# Patient Record
Sex: MALE | Race: WHITE | NOT HISPANIC OR LATINO | Employment: UNEMPLOYED | URBAN - METROPOLITAN AREA
[De-identification: names, ages, dates, MRNs, and addresses within clinical notes are randomized per-mention and may not be internally consistent; named-entity substitution may affect disease eponyms.]

---
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
## 2023-08-23 ENCOUNTER — TELEMEDICINE (OUTPATIENT)
Dept: FAMILY MEDICINE CLINIC | Facility: TELEHEALTH | Age: 18
End: 2023-08-23
Payer: COMMERCIAL

## 2023-08-23 VITALS — BODY MASS INDEX: 20.51 KG/M2 | WEIGHT: 165 LBS | HEIGHT: 75 IN

## 2023-08-23 DIAGNOSIS — H10.13 ALLERGIC CONJUNCTIVITIS OF BOTH EYES: Primary | ICD-10-CM

## 2023-08-23 PROCEDURE — 99213 OFFICE O/P EST LOW 20 MIN: CPT | Performed by: NURSE PRACTITIONER

## 2023-08-23 NOTE — PROGRESS NOTES
"You have chosen to receive care through a telehealth visit.  Do you consent to use a video/audio connection for your medical care today? Yes     HPI  Alcides Smith is a 18 y.o. male  presents with complaint of eye problem. He reports that he was seen in the Urgent Care yesterday for pink/red eye in both eyes and that they gave him eye drops and said that they would work in the first 24 hours. He was prescribed tobramycin ophthalmic drops. He reports that he was seen early yesterday morning and his eyes are even more red and itchy than before and that the eye drops not helping much. He reports that they seem better for about 20 minutes and then they are back to the same. He does report when asked that his allergies were bothering him when he moved into his new house but he started taking an allergy pill and using Flonase and those symptoms have improved. He is using cool compresses . He is trying not to rub the eyes but reports that it is hard.       Review of Systems   Constitutional:  Negative for fever.   HENT:  Negative for sneezing. Congestion: improved.   Eyes:  Positive for pain, discharge (watery, yellow at times in corner), redness and itching (bilateral).     Past Medical History:   Diagnosis Date    Allergic        No family history on file.    Social History     Socioeconomic History    Marital status: Single   Tobacco Use    Smoking status: Never    Smokeless tobacco: Never   Vaping Use    Vaping Use: Some days   Substance and Sexual Activity    Alcohol use: Never    Drug use: Never    Sexual activity: Defer       Alcides Smith  reports that he has never smoked. He has never used smokeless tobacco..      Ht 190.5 cm (75\")   Wt 74.8 kg (165 lb)   BMI 20.62 kg/mý     PHYSICAL EXAM  Physical Exam   Constitutional: He is oriented to person, place, and time. He appears well-developed.   HENT:   Head: Normocephalic and atraumatic.   Nose: Nose normal.   Eyes: Right eye exhibits discharge and " edema (erythematous). Left eye exhibits discharge and edema (erythematous). Right conjunctiva is injected. Left conjunctiva is injected.   Pulmonary/Chest:  No respiratory distress.  Neurological: He is alert and oriented to person, place, and time. No cranial nerve deficit.   Psychiatric: He has a normal mood and affect. His speech is normal and behavior is normal. Judgment and thought content normal.     Results for orders placed or performed during the hospital encounter of 03/27/22   POCT Influenza A/B    Specimen: Swab   Result Value Ref Range    Rapid Influenza A Ag Negative Negative    Rapid Influenza B Ag Negative Negative    Internal Control Passed Passed    Lot Number [id number]     Expiration Date 10/23        Diagnoses and all orders for this visit:    1. Allergic conjunctivitis of both eyes (Primary)    Other orders  -     dexAMETHasone (DECADRON) 0.1 % ophthalmic suspension; Administer 1 drop to both eyes Every 4 (Four) Hours for 3 days.  Dispense: 5 mL; Refill: 0    May continue tobramycin ophthalmic drops ordered at yesterdays Urgent Care visit  Start dexamethasone ophthalmic drops as directed  Continue Flonase and antihistamine of choice  Wash eye with clean cloth and baby shampoo  Apply eye drops as directed  Try not to touch eyes  Wash hands frequently  Continue cool compresses for itching and selling 5-10 minutes 3-4 times daily  Try not to rub or scratch eyes    FOLLOW-UP  If symptoms worsen or persist follow up with opthalmologist    Patient verbalizes understanding of medication dosage, comfort measures, instructions for treatment and follow-up.    Jaci Zhang, DARYL  08/23/2023  18:33 EDT    The use of a video visit has been reviewed with the patient and verbal informed consent has been obtained. Myself and Alcides Smith participated in this visit. The patient is located in [address] [id number].    I am located in [place], [place]. [employer] and [email or web address] were  utilized. I spent 25 minutes in the patient's chart for this visit.

## 2023-08-23 NOTE — LETTER
August 23, 2023       No Recipients    Patient: Alcides Smith   YOB: 2005   Date of Visit: 8/23/2023     To whom it may concern:       Alcides Smith was evaluated by me and may return to school on 08/25/2023.       Sincerely,        DARYL Diallo        CC:   No Recipients